# Patient Record
Sex: FEMALE
[De-identification: names, ages, dates, MRNs, and addresses within clinical notes are randomized per-mention and may not be internally consistent; named-entity substitution may affect disease eponyms.]

---

## 2018-01-09 ENCOUNTER — HOSPITAL ENCOUNTER (OUTPATIENT)
Dept: HOSPITAL 5 - SPVIMAG | Age: 83
Discharge: HOME | End: 2018-01-09
Attending: INTERNAL MEDICINE
Payer: MEDICARE

## 2018-01-09 DIAGNOSIS — M11.261: ICD-10-CM

## 2018-01-09 DIAGNOSIS — M17.11: Primary | ICD-10-CM

## 2018-01-09 DIAGNOSIS — M85.861: ICD-10-CM

## 2018-01-09 DIAGNOSIS — M11.262: ICD-10-CM

## 2018-01-09 NOTE — XRAY REPORT
XRAY RIGHT KNEE 3 THREE VIEWS: 01/09/18 11:43:00





CLINICAL: Right knee pain.



FINDINGS: Mild osteopenia.  Bilateral meniscal chondrocalcinosis.  

Lateral osteoarthritis with narrowing of the joint space and small 

osteophytes.  The medial joint space is preserved.  Mild patellofemoral 

joint osteoarthritis with a small inferior patellar osteophyte.  No 

fracture or  dislocation.  No joint effusion.Normal soft tissues.



IMPRESSION: Osteoarthritis with greater involvement of the lateral 

joint space.